# Patient Record
Sex: FEMALE | Race: ASIAN | URBAN - METROPOLITAN AREA
[De-identification: names, ages, dates, MRNs, and addresses within clinical notes are randomized per-mention and may not be internally consistent; named-entity substitution may affect disease eponyms.]

---

## 2022-02-25 ENCOUNTER — APPOINTMENT (RX ONLY)
Dept: URBAN - METROPOLITAN AREA OTHER 8 | Facility: OTHER | Age: 42
Setting detail: DERMATOLOGY
End: 2022-02-25

## 2022-02-25 DIAGNOSIS — L81.4 OTHER MELANIN HYPERPIGMENTATION: ICD-10-CM

## 2022-02-25 DIAGNOSIS — L64.8 OTHER ANDROGENIC ALOPECIA: ICD-10-CM

## 2022-02-25 PROCEDURE — ? PHOTO-DOCUMENTATION

## 2022-02-25 PROCEDURE — ? TREATMENT REGIMEN

## 2022-02-25 PROCEDURE — ? PRODUCT LINE (HYPERPIGMENTATION)

## 2022-02-25 PROCEDURE — ? COUNSELING

## 2022-02-25 PROCEDURE — 99203 OFFICE O/P NEW LOW 30 MIN: CPT

## 2022-02-25 ASSESSMENT — LOCATION SIMPLE DESCRIPTION DERM
LOCATION SIMPLE: LEFT CHEEK
LOCATION SIMPLE: RIGHT FOREHEAD
LOCATION SIMPLE: SCALP
LOCATION SIMPLE: RIGHT OCCIPITAL SCALP
LOCATION SIMPLE: RIGHT CHEEK

## 2022-02-25 ASSESSMENT — LOCATION DETAILED DESCRIPTION DERM
LOCATION DETAILED: LEFT INFERIOR CENTRAL MALAR CHEEK
LOCATION DETAILED: RIGHT INFERIOR MEDIAL FOREHEAD
LOCATION DETAILED: RIGHT INFERIOR MEDIAL MALAR CHEEK
LOCATION DETAILED: RIGHT SUPERIOR PARIETAL SCALP
LOCATION DETAILED: RIGHT CENTRAL PARIETAL SCALP
LOCATION DETAILED: RIGHT SUPERIOR OCCIPITAL SCALP
LOCATION DETAILED: LEFT CENTRAL PARIETAL SCALP

## 2022-02-25 ASSESSMENT — LOCATION ZONE DERM
LOCATION ZONE: SCALP
LOCATION ZONE: FACE

## 2022-02-25 NOTE — PROCEDURE: TREATMENT REGIMEN
Detail Level: Simple
Plan: Discussed laser hair comb/cap & PRP Treatment \\nHair loss handout given \\nRecheck in 4 months
Initiate Treatment: -Rogaine - once nightly to scalp x 6 months \\n-VEGAN alternative to Viviscal daily
Discontinue Regimen: Hair Oils
Modify Regimen: Protein rich diet

## 2022-02-25 NOTE — PROCEDURE: PRODUCT LINE (HYPERPIGMENTATION)
Product 20 Price (In Dollars - Numeric Only, No Special Characters Or $): 0.00
Product 75 Units: 0
Product 2 Units: 1
Product 2 Application Directions: Apply to face once nightly
Risk Of Complication Category: Low (OTC Medications)
Assigning Risk Information: Per AMA, level of risk is based upon consequences of the problem(s) addressed at the encounter when appropriately treated. Risk also includes medical decision making related to the need to initiate or forego further testing, treatment and/or hospitalization. Over the counter medication are assigned a risk level of low. Prescription medication management is assigned a risk level of moderate.
Send Charges To Patient Encounter: No
Name Of Product 3: SkinBetter [place] defense serum
Allow Plan To Count Towards E/M Coding: No (this will remove associated impression from E/M calculation)
Product 3 Price (In Dollars - Numeric Only, No Special Characters Or $): 155.0
Product 1 Price (In Dollars - Numeric Only, No Special Characters Or $): 145.00
Name Of Product 1: SkinBetter Even Tone
Product 1 Application Directions: Apply across entire face BID.
Name Of Product 2: AlphaRet Overnight cream
Detail Level: Zone
Product 2 Price (In Dollars - Numeric Only, No Special Characters Or $): 110.00

## 2022-12-01 NOTE — PROCEDURE: COUNSELING
Left message on machine asking patient to call back and confirm if she will be following [facility] to this location and if so, to schedule an appointment
Detail Level: Zone